# Patient Record
Sex: FEMALE | Race: WHITE | NOT HISPANIC OR LATINO | Employment: FULL TIME | URBAN - METROPOLITAN AREA
[De-identification: names, ages, dates, MRNs, and addresses within clinical notes are randomized per-mention and may not be internally consistent; named-entity substitution may affect disease eponyms.]

---

## 2019-03-12 ENCOUNTER — HOSPITAL ENCOUNTER (OUTPATIENT)
Dept: FAMILY MEDICINE CLINIC | Facility: CLINIC | Age: 44
Setting detail: SPECIMEN
Discharge: HOME | End: 2019-03-12
Attending: INTERNAL MEDICINE | Admitting: INTERNAL MEDICINE

## 2019-03-12 LAB
ALBUMIN SERPL-MCNC: 4 G/DL (ref 3.5–4.8)
ALBUMIN/GLOB SERPL: 1.5 {RATIO} (ref 1–1.7)
ALP SERPL-CCNC: 42 IU/L (ref 32–91)
ALT SERPL-CCNC: 13 IU/L (ref 14–54)
ANION GAP SERPL CALC-SCNC: 12.2 MMOL/L (ref 10–20)
AST SERPL-CCNC: 15 IU/L (ref 15–41)
BASOPHILS # BLD AUTO: 0.1 10*3/UL (ref 0–0.2)
BASOPHILS NFR BLD AUTO: 1 % (ref 0–2)
BILIRUB SERPL-MCNC: 0.8 MG/DL (ref 0.3–1.2)
BUN SERPL-MCNC: 11 MG/DL (ref 8–20)
BUN/CREAT SERPL: 13.8 (ref 5.4–26.2)
CALCIUM SERPL-MCNC: 8.8 MG/DL (ref 8.9–10.3)
CHLORIDE SERPL-SCNC: 105 MMOL/L (ref 101–111)
CHOLEST SERPL-MCNC: 193 MG/DL
CHOLEST/HDLC SERPL: 3.1 {RATIO}
CONV CO2: 25 MMOL/L (ref 22–32)
CONV LDL CHOLESTEROL DIRECT: 130 MG/DL (ref 0–100)
CONV TOTAL PROTEIN: 6.6 G/DL (ref 6.1–7.9)
CREAT UR-MCNC: 0.8 MG/DL (ref 0.4–1)
DIFFERENTIAL METHOD BLD: (no result)
EOSINOPHIL # BLD AUTO: 0.2 10*3/UL (ref 0–0.3)
EOSINOPHIL # BLD AUTO: 4 % (ref 0–3)
ERYTHROCYTE [DISTWIDTH] IN BLOOD BY AUTOMATED COUNT: 12.9 % (ref 11.5–14.5)
GLOBULIN UR ELPH-MCNC: 2.6 G/DL (ref 2.5–3.8)
GLUCOSE SERPL-MCNC: 92 MG/DL (ref 65–99)
HCT VFR BLD AUTO: 44.1 % (ref 35–49)
HDLC SERPL-MCNC: 63 MG/DL
HGB BLD-MCNC: 14.8 G/DL (ref 12–15)
LDLC/HDLC SERPL: 2.1 {RATIO}
LIPID INTERPRETATION: ABNORMAL
LYMPHOCYTES # BLD AUTO: 1.9 10*3/UL (ref 0.8–4.8)
LYMPHOCYTES NFR BLD AUTO: 31 % (ref 18–42)
MCH RBC QN AUTO: 31.3 PG (ref 26–32)
MCHC RBC AUTO-ENTMCNC: 33.4 G/DL (ref 32–36)
MCV RBC AUTO: 93.7 FL (ref 80–94)
MONOCYTES # BLD AUTO: 0.5 10*3/UL (ref 0.1–1.3)
MONOCYTES NFR BLD AUTO: 8 % (ref 2–11)
NEUTROPHILS # BLD AUTO: 3.5 10*3/UL (ref 2.3–8.6)
NEUTROPHILS NFR BLD AUTO: 56 % (ref 50–75)
NRBC BLD AUTO-RTO: 0 /100{WBCS}
NRBC/RBC NFR BLD MANUAL: 0 10*3/UL
PLATELET # BLD AUTO: 253 10*3/UL (ref 150–450)
PMV BLD AUTO: 8.3 FL (ref 7.4–10.4)
POTASSIUM SERPL-SCNC: 4.2 MMOL/L (ref 3.6–5.1)
RBC # BLD AUTO: 4.71 10*6/UL (ref 4–5.4)
SODIUM SERPL-SCNC: 138 MMOL/L (ref 136–144)
TRIGL SERPL-MCNC: 32 MG/DL
VLDLC SERPL CALC-MCNC: 0.3 MG/DL
WBC # BLD AUTO: 6.1 10*3/UL (ref 4.5–11.5)

## 2019-08-26 RX ORDER — CITALOPRAM 20 MG/1
TABLET ORAL
Qty: 90 TABLET | Refills: 2 | Status: SHIPPED | OUTPATIENT
Start: 2019-08-26 | End: 2020-06-03

## 2019-09-05 RX ORDER — ALPRAZOLAM 0.5 MG/1
TABLET ORAL
Qty: 25 TABLET | Refills: 0 | OUTPATIENT
Start: 2019-09-05

## 2019-09-25 ENCOUNTER — OFFICE VISIT (OUTPATIENT)
Dept: FAMILY MEDICINE CLINIC | Facility: CLINIC | Age: 44
End: 2019-09-25

## 2019-09-25 VITALS
RESPIRATION RATE: 8 BRPM | DIASTOLIC BLOOD PRESSURE: 80 MMHG | HEART RATE: 64 BPM | WEIGHT: 170 LBS | BODY MASS INDEX: 29.64 KG/M2 | TEMPERATURE: 98.5 F | SYSTOLIC BLOOD PRESSURE: 120 MMHG

## 2019-09-25 DIAGNOSIS — F41.9 ANXIETY: Primary | ICD-10-CM

## 2019-09-25 DIAGNOSIS — D05.12 DUCTAL CARCINOMA IN SITU (DCIS) OF LEFT BREAST: ICD-10-CM

## 2019-09-25 PROCEDURE — 99213 OFFICE O/P EST LOW 20 MIN: CPT | Performed by: INTERNAL MEDICINE

## 2019-09-25 RX ORDER — OMEPRAZOLE 20 MG/1
CAPSULE, DELAYED RELEASE ORAL
COMMUNITY
Start: 2015-06-18 | End: 2019-11-12

## 2019-09-25 RX ORDER — ALPRAZOLAM 0.5 MG/1
TABLET ORAL EVERY 24 HOURS
COMMUNITY
Start: 2019-03-07 | End: 2019-09-25 | Stop reason: SDUPTHER

## 2019-09-25 RX ORDER — ALPRAZOLAM 0.5 MG/1
0.5 TABLET ORAL NIGHTLY PRN
Qty: 30 TABLET | Refills: 1 | Status: SHIPPED | OUTPATIENT
Start: 2019-09-25 | End: 2020-12-01 | Stop reason: SDUPTHER

## 2019-09-25 RX ORDER — FLUTICASONE PROPIONATE 50 MCG
2 SPRAY, SUSPENSION (ML) NASAL DAILY
COMMUNITY
Start: 2013-04-02 | End: 2019-11-12

## 2019-09-25 RX ORDER — ELETRIPTAN HYDROBROMIDE 40 MG/1
TABLET, FILM COATED ORAL
COMMUNITY
Start: 2013-03-29 | End: 2019-11-12

## 2019-09-25 NOTE — PROGRESS NOTES
Rooming Tab(CC,VS,Pt Hx,Fall Screen)  Chief Complaint   Patient presents with   • Anxiety       Subjective   Pt here for anxiety-  Feels the celexa is working- but the fatigue is severe- occasionally still with panic attack and will  Take the xanax-    just found out that  Has DCIS- to get double mastectomy soon. Awaiting gene testing. A lot on plate  Poor sleep but dealing best can   I have reviewed and updated her medications, medical history and problem list during today's office visit.     Patient Care Team:  Mindy Hernandez MD as PCP - General (Internal Medicine)    Problem List Tab  Medications Tab  Synopsis Tab  Chart Review Tab  Care Everywhere Tab  Immunizations Tab  Patient History Tab    Social History     Tobacco Use   • Smoking status: Never Smoker   Substance Use Topics   • Alcohol use: Yes     Frequency: Monthly or less       Review of Systems   Constitutional: Negative for fatigue and fever.   HENT: Negative for congestion.    Respiratory: Negative for apnea, cough and wheezing.    Cardiovascular: Negative for chest pain.   Gastrointestinal: Negative for abdominal distention.   Neurological: Negative for syncope.   Psychiatric/Behavioral: Negative for behavioral problems.       Objective     Rooming Tab(CC,VS,Pt Hx,Fall Screen)  /80 (BP Location: Left arm, Patient Position: Sitting, Cuff Size: Adult)   Pulse 64   Temp 98.5 °F (36.9 °C)   Resp 8   Wt 77.1 kg (170 lb)   BMI 29.64 kg/m²     Body mass index is 29.64 kg/m².    Physical Exam   Constitutional: She is oriented to person, place, and time. She appears well-developed and well-nourished.   HENT:   Head: Normocephalic and atraumatic.   Right Ear: Tympanic membrane normal.   Left Ear: Tympanic membrane normal.   Eyes: Pupils are equal, round, and reactive to light.   Neck: Normal range of motion. Neck supple.   Cardiovascular: Normal rate and regular rhythm.   No murmur heard.  Pulmonary/Chest: Effort normal and breath sounds  normal.   Abdominal: Soft. Bowel sounds are normal. She exhibits no distension.   Neurological: She is oriented to person, place, and time.   Skin: Capillary refill takes less than 2 seconds.   Nursing note and vitals reviewed.       Statin Choice Calculator  Data Reviewed:                   Assessment/Plan   Order Review Tab  Health Maintenance Tab  Patient Plan/Order Tab  Diagnoses and all orders for this visit:    1. Anxiety (Primary)  Assessment & Plan:  Refill xanax      2. Ductal carcinoma in situ (DCIS) of left breast  Assessment & Plan:   To get gene testing back then decide  If lumpectomy or mastectomy and single or bilateral      Other orders  -     ALPRAZolam (XANAX) 0.5 MG tablet; Take 1 tablet by mouth At Night As Needed for Anxiety.  Dispense: 30 tablet; Refill: 1      Wrapup Tab  Return in about 6 months (around 3/25/2020).       They were informed of the diagnosis and treatment plan and directed to f/u for any further problems or concerns.

## 2019-10-31 ENCOUNTER — TRANSITIONAL CARE MANAGEMENT TELEPHONE ENCOUNTER (OUTPATIENT)
Dept: FAMILY MEDICINE CLINIC | Facility: CLINIC | Age: 44
End: 2019-10-31

## 2019-10-31 RX ORDER — HYDROCODONE BITARTRATE AND ACETAMINOPHEN 5; 325 MG/1; MG/1
1-2 TABLET ORAL
COMMUNITY
Start: 2019-10-29 | End: 2020-10-01

## 2019-10-31 RX ORDER — CEPHALEXIN 500 MG/1
500 CAPSULE ORAL 3 TIMES DAILY
COMMUNITY
Start: 2019-10-29 | End: 2019-11-08

## 2019-10-31 RX ORDER — PROMETHAZINE HYDROCHLORIDE 12.5 MG/1
12.5 TABLET ORAL
COMMUNITY
Start: 2019-10-29 | End: 2019-11-05

## 2019-11-12 ENCOUNTER — OFFICE VISIT (OUTPATIENT)
Dept: FAMILY MEDICINE CLINIC | Facility: CLINIC | Age: 44
End: 2019-11-12

## 2019-11-12 VITALS
RESPIRATION RATE: 16 BRPM | TEMPERATURE: 98.8 F | DIASTOLIC BLOOD PRESSURE: 86 MMHG | HEIGHT: 63 IN | HEART RATE: 64 BPM | SYSTOLIC BLOOD PRESSURE: 122 MMHG | OXYGEN SATURATION: 99 % | WEIGHT: 174.8 LBS | BODY MASS INDEX: 30.97 KG/M2

## 2019-11-12 DIAGNOSIS — D05.12 DUCTAL CARCINOMA IN SITU (DCIS) OF LEFT BREAST: Primary | ICD-10-CM

## 2019-11-12 DIAGNOSIS — IMO0001 TRANSITION OF CARE PERFORMED WITH SHARING OF CLINICAL SUMMARY: ICD-10-CM

## 2019-11-12 PROBLEM — Z78.9 TRANSITION OF CARE PERFORMED WITH SHARING OF CLINICAL SUMMARY: Status: ACTIVE | Noted: 2019-11-12

## 2019-11-12 PROCEDURE — 99495 TRANSJ CARE MGMT MOD F2F 14D: CPT | Performed by: INTERNAL MEDICINE

## 2019-11-12 NOTE — PROGRESS NOTES
"Rooming Tab(CC,VS,Pt Hx,Fall Screen)  Chief Complaint   Patient presents with   • Transitional Care Management       Subjective   Pt had left mastectomy last week with plastic surgery- Dr. Vicente-  Had drain removed yesterday-  Doing well down to 1 narcotic a day- mostly ibuprofen only. Some BM issues but getting better. To go back to work part time end of week   no lifting or climbing.   eating good    no depression  pathology good- lymph nodes negative.  Had to have mastectomy because of the size. Of the DCIS- to get implant on right side to match toe left now.   To see oncology next week to decide about if pills needed.   I have reviewed and updated her medications, medical history and problem list during today's office visit.     Patient Care Team:  Mindy Hernandez MD as PCP - General (Internal Medicine)    Problem List Tab  Medications Tab  Synopsis Tab  Chart Review Tab  Care Everywhere Tab  Immunizations Tab  Patient History Tab    Social History     Tobacco Use   • Smoking status: Never Smoker   • Smokeless tobacco: Never Used   Substance Use Topics   • Alcohol use: Yes     Frequency: Monthly or less       Review of Systems   Constitutional: Negative for fatigue and fever.   HENT: Negative for congestion.    Respiratory: Negative for apnea, cough and wheezing.    Cardiovascular: Negative for chest pain.   Gastrointestinal: Negative for abdominal distention.   Skin: Positive for bruise.   Neurological: Negative for syncope and weakness.   Psychiatric/Behavioral: Negative for behavioral problems.       Objective     Rooming Tab(CC,VS,Pt Hx,Fall Screen)  /86 (BP Location: Right arm, Patient Position: Sitting, Cuff Size: Adult)   Pulse 64   Temp 98.8 °F (37.1 °C) (Oral)   Resp 16   Ht 160 cm (63\")   Wt 79.3 kg (174 lb 12.8 oz)   SpO2 99%   BMI 30.96 kg/m²     Body mass index is 30.96 kg/m².    Physical Exam   Constitutional: She is oriented to person, place, and time. She appears " well-developed and well-nourished.   HENT:   Head: Normocephalic and atraumatic.   Eyes: EOM are normal. Pupils are equal, round, and reactive to light.   Cardiovascular: Normal rate and regular rhythm.   No murmur heard.  Pulmonary/Chest: Effort normal and breath sounds normal. No respiratory distress.   Abdominal: Soft. Bowel sounds are normal. She exhibits no distension.   Neurological: She is alert and oriented to person, place, and time.   Skin:   bruising and swelling in breast tissue- drain removed   Nursing note and vitals reviewed.       Statin Choice Calculator  Data Reviewed:               Lab Results   Component Value Date    WBC 4.75 10/29/2019    RBC 4.74 10/29/2019    HCT 44.9 10/29/2019    MCV 94.7 10/29/2019    MCH 31.4 10/29/2019      Assessment/Plan   Order Review Tab  Health Maintenance Tab  Patient Plan/Order Tab  Diagnoses and all orders for this visit:    1. Ductal carcinoma in situ (DCIS) of left breast (Primary)    2. Transition of care performed with sharing of clinical summary  Assessment & Plan:    To watch bowels.         Wrapup Tab  Return in about 4 weeks (around 12/10/2019).         Transitional Care Management Certification  I certify that the following are true:  1. Communication was made within 2 business days of discharge.  2. Complexity of Medical Decision Making is moderate.  3. Face to face visit occurred within 7 days.    *Note: 94305 is for high complexity patients with a face to face visit within 7 days of discharge.  09028 is for high complexity patients with a face to face on days 8-14 post discharge or medium complexity with face to face visit within 14 days post discharge.

## 2020-01-07 ENCOUNTER — TELEPHONE (OUTPATIENT)
Dept: FAMILY MEDICINE CLINIC | Facility: CLINIC | Age: 45
End: 2020-01-07

## 2020-01-07 NOTE — TELEPHONE ENCOUNTER
Two Saturdays ago patient was at [facility] and had double ear infection and sinus infection and was given Amoxil. She is better, but her left ear is throbbing/popping and uncomfortable. Asking what to do, or if need more med. Thank you.

## 2020-03-09 ENCOUNTER — TELEPHONE (OUTPATIENT)
Dept: FAMILY MEDICINE CLINIC | Facility: CLINIC | Age: 45
End: 2020-03-09

## 2020-03-09 RX ORDER — SUMATRIPTAN 100 MG/1
TABLET, FILM COATED ORAL
Qty: 9 TABLET | Refills: 0 | Status: SHIPPED | OUTPATIENT
Start: 2020-03-09 | End: 2020-10-01 | Stop reason: SDUPTHER

## 2020-03-09 RX ORDER — TAMOXIFEN CITRATE 20 MG/1
20 TABLET ORAL DAILY
COMMUNITY
Start: 2019-11-18 | End: 2020-10-01

## 2020-03-09 NOTE — TELEPHONE ENCOUNTER
Patient is calling to request something for migraine.  She states she has had it since Saturday.  There are no available appointments before 03/18, when she has a 6 month follow up scheduled with Dr. Hernandez.    [facility] [address], [place] confirmed    Patient call back [phone]

## 2020-06-03 RX ORDER — CITALOPRAM 20 MG/1
TABLET ORAL
Qty: 90 TABLET | Refills: 1 | Status: SHIPPED | OUTPATIENT
Start: 2020-06-03 | End: 2020-10-05

## 2020-10-01 ENCOUNTER — OFFICE VISIT (OUTPATIENT)
Dept: FAMILY MEDICINE CLINIC | Facility: CLINIC | Age: 45
End: 2020-10-01

## 2020-10-01 VITALS
OXYGEN SATURATION: 96 % | BODY MASS INDEX: 33.13 KG/M2 | WEIGHT: 187 LBS | TEMPERATURE: 97.3 F | DIASTOLIC BLOOD PRESSURE: 78 MMHG | RESPIRATION RATE: 18 BRPM | HEART RATE: 76 BPM | SYSTOLIC BLOOD PRESSURE: 126 MMHG

## 2020-10-01 DIAGNOSIS — G43.109 MIGRAINE WITH AURA AND WITHOUT STATUS MIGRAINOSUS, NOT INTRACTABLE: Primary | ICD-10-CM

## 2020-10-01 PROBLEM — Z00.00 PREVENTATIVE HEALTH CARE: Status: ACTIVE | Noted: 2019-03-12

## 2020-10-01 PROBLEM — F41.1 GENERALIZED ANXIETY DISORDER: Status: ACTIVE | Noted: 2019-03-07

## 2020-10-01 PROBLEM — C50.912 BREAST CANCER, LEFT (HCC): Status: ACTIVE | Noted: 2019-10-11

## 2020-10-01 PROCEDURE — 99213 OFFICE O/P EST LOW 20 MIN: CPT | Performed by: INTERNAL MEDICINE

## 2020-10-01 PROCEDURE — 90471 IMMUNIZATION ADMIN: CPT | Performed by: INTERNAL MEDICINE

## 2020-10-01 PROCEDURE — 90686 IIV4 VACC NO PRSV 0.5 ML IM: CPT | Performed by: INTERNAL MEDICINE

## 2020-10-01 RX ORDER — ERENUMAB-AOOE 70 MG/ML
70 INJECTION SUBCUTANEOUS ONCE
Qty: 1 ML | Refills: 5 | Status: SHIPPED | OUTPATIENT
Start: 2020-10-01 | End: 2020-10-01

## 2020-10-01 RX ORDER — SUMATRIPTAN 100 MG/1
TABLET, FILM COATED ORAL
Qty: 9 TABLET | Refills: 5 | Status: SHIPPED | OUTPATIENT
Start: 2020-10-01 | End: 2022-04-06 | Stop reason: SDUPTHER

## 2020-10-01 NOTE — PROGRESS NOTES
Rooming Tab(CC,VS,Pt Hx,Fall Screen)  Chief Complaint   Patient presents with   • Migraine       Subjective   Pt here with increased headaches-  Gets couple a week and debiliating imitrex works 50% of time only.     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 -   eats frequently- but not feeling that from depression      I have reviewed and updated her medications, medical history and problem list during today's office visit.     Patient Care Team:  Mindy Hernandez MD as PCP - General (Internal Medicine)    Problem List Tab  Medications Tab  Synopsis Tab  Chart Review Tab  Care Everywhere Tab  Immunizations Tab  Patient History Tab    Social History     Tobacco Use   • Smoking status: Never Smoker   • Smokeless tobacco: Never Used   Substance Use Topics   • Alcohol use: Yes     Frequency: Monthly or less       Review of Systems   Constitutional: Negative for fatigue.   Respiratory: Negative for chest tightness.    Cardiovascular: Negative for chest pain.   Neurological: Positive for headache.       Objective     Rooming Tab(CC,VS,Pt Hx,Fall Screen)  /78 (BP Location: Left arm, Patient Position: Sitting, Cuff Size: Adult)   Pulse 76   Temp 97.3 °F (36.3 °C) (Temporal)   Resp 18   Wt 84.8 kg (187 lb)   SpO2 96%   BMI 33.13 kg/m²     Body mass index is 33.13 kg/m².    Physical Exam  Vitals signs and nursing note reviewed.   Constitutional:       Appearance: Normal appearance.   HENT:      Left Ear: Tympanic membrane normal.   Cardiovascular:      Rate and Rhythm: Normal rate and regular rhythm.      Pulses: Normal pulses.      Heart sounds: Normal heart sounds.   Pulmonary:      Effort: Pulmonary effort is normal.      Breath sounds: Normal breath sounds.   Neurological:      Mental Status: She is alert.          Statin Choice Calculator  Data  Reviewed:               Lab Results   Component Value Date    TSH 1.500 08/25/2020    [id number] 42.9 08/25/2020      Assessment/Plan   Order Review Tab  Health Maintenance Tab  Patient Plan/Order Tab  Diagnoses and all orders for this visit:    1. Migraine with aura and without status migrainosus, not intractable (Primary)  Comments:  will start aimovg for preventative measures    Other orders  -     Erenumab-aooe (Aimovig) 70 MG/ML prefilled syringe; Inject 1 mL under the skin into the appropriate area as directed 1 (One) Time for 1 dose.  Dispense: 1 mL; Refill: 5  -     FluLaval Quad >6 Months (0774-2530)  -     SUMAtriptan (Imitrex) 100 MG tablet; Take one tablet at onset of headache. May repeat dose one time in 2 hours if headache not relieved.  Dispense: 9 tablet; Refill: 5        Wrapup Tab  Return if symptoms worsen or fail to improve.       They were informed of the diagnosis and treatment plan and directed to f/u for any further problems or concerns.

## 2020-10-05 RX ORDER — CITALOPRAM 20 MG/1
TABLET ORAL
Qty: 90 TABLET | Refills: 1 | Status: SHIPPED | OUTPATIENT
Start: 2020-10-05 | End: 2021-07-01

## 2020-10-22 ENCOUNTER — TELEPHONE (OUTPATIENT)
Dept: FAMILY MEDICINE CLINIC | Facility: CLINIC | Age: 45
End: 2020-10-22

## 2020-10-22 NOTE — TELEPHONE ENCOUNTER
Caller: KaelMikala    Relationship: Self    Best call back number: [phone]    What medication are you requesting: SOMETHING TO RELIEVE OF SYMPTOMS    What are your current symptoms: PAIN WHEN PRESSURE ON VAGINA, SPECIFICALLY NOT THE LABIA BUT INSIDE OF VAGINA, NOT ITCHING OR BURNING    How long have you been experiencing symptoms: 2 DAYS    Have you had these symptoms before:    [x] Yes  [] No    Have you been treated for these symptoms before:   [x] Yes  [] No    If a prescription is needed, what is your preferred pharmacy and phone number: [id number] [place] [facility] [address] [facility] [phone] [facility] [phone]      Additional notes: PATIENT STATES SHE TOOK AN AZO AND IT SEEMED TO HELP. PATIENT STATES SHE HAS HAD A YEAST INFECTION BEFORE BUT DOESN'T FEEL LIKE THIS IS A YEAST INFECTION DUE TO NO ITCHING NOR BURNING. PATIENT STATES NO PAIN WHILE URINATING, JUST GENERAL DISCOMFORT

## 2020-10-22 NOTE — TELEPHONE ENCOUNTER
Pt would need to be examined to see if vagina bacterial infection-  Can try OTC yeast infection cream, and if no better would be seen

## 2020-12-01 DIAGNOSIS — F41.9 ANXIETY: Primary | ICD-10-CM

## 2020-12-02 RX ORDER — ALPRAZOLAM 0.5 MG/1
0.5 TABLET ORAL NIGHTLY PRN
Qty: 30 TABLET | Refills: 1 | Status: SHIPPED | OUTPATIENT
Start: 2020-12-02 | End: 2021-05-11

## 2020-12-17 ENCOUNTER — TELEPHONE (OUTPATIENT)
Dept: FAMILY MEDICINE CLINIC | Facility: CLINIC | Age: 45
End: 2020-12-17

## 2020-12-17 NOTE — TELEPHONE ENCOUNTER
PATIENT REPORTS THE FOLLOWING SYMPTOMS:    * STOPPED UP NOSE: CONGESTIONS  * CHILLS  * BODY ACHES  * COUGHING  * FATIGUE    PATIENT STATED SHE COULD NOT GET A COVID TEST UNTIL Saturday. I TOLD HER ABOUT URGENT CARE.    CALL BACK #; [phone]    PHARMACY: [id number] [address] [phone] [facility] [phone]

## 2020-12-17 NOTE — TELEPHONE ENCOUNTER
Sounds like pt has COVID- would stay quarantined in home 10 days from when symptoms started. Check o2 sats if develops short or air.  I can schedule a COVID test at outpt lab on Friday if she is wanting one.  Take 2000 units of vD3, tylenol cold and flu and lots of fluids

## 2020-12-18 PROCEDURE — U0003 INFECTIOUS AGENT DETECTION BY NUCLEIC ACID (DNA OR RNA); SEVERE ACUTE RESPIRATORY SYNDROME CORONAVIRUS 2 (SARS-COV-2) (CORONAVIRUS DISEASE [COVID-19]), AMPLIFIED PROBE TECHNIQUE, MAKING USE OF HIGH THROUGHPUT TECHNOLOGIES AS DESCRIBED BY CMS-2020-01-R: HCPCS | Performed by: NURSE PRACTITIONER

## 2020-12-18 NOTE — TELEPHONE ENCOUNTER
Attempted to call patient this morning, her mailbox is full. It [place] [facility] [place]

## 2020-12-18 NOTE — TELEPHONE ENCOUNTER
"ATTEMPTED TO CALL PATIENT 2ND TIME, STILL NO ANSWER.    HUB IS PATIENT CALL BACK PLEASE TELL HER TE FOLLOWING:    \"Sounds like pt has COVID- would stay quarantined in home 10 days from when symptoms started. Check o2 sats if develops short or air.  I can schedule a COVID test at outpt lab on Friday if she is wanting one.  Take 2000 units of vD3, tylenol cold and flu and lots of fluids\"  "

## 2020-12-21 ENCOUNTER — TELEPHONE (OUTPATIENT)
Dept: URGENT CARE | Facility: CLINIC | Age: 45
End: 2020-12-21

## 2020-12-21 NOTE — TELEPHONE ENCOUNTER
Made aware of positive COVID-19 test results.  She will continue to quarantine for the full 10 days.  She has been made aware that someone from the health department will be notifying her that she needs to contact anyone that she was in close proximity with 48 hours prior to her symptoms.  Her family is on quarantine as well.  They are quarantine will start 14 days once her 10 days is up.  Patient verbalized understanding she states she has no new or worsening symptoms at this time.  I did request that she had a follow-up visit with her PCP approximately 1 week after her quarantine has .  Patient verbalized understanding

## 2021-05-11 DIAGNOSIS — F41.9 ANXIETY: ICD-10-CM

## 2021-05-11 RX ORDER — ALPRAZOLAM 0.5 MG/1
0.5 TABLET ORAL NIGHTLY PRN
Qty: 30 TABLET | Refills: 2 | Status: SHIPPED | OUTPATIENT
Start: 2021-05-11 | End: 2021-12-14

## 2021-07-01 RX ORDER — CITALOPRAM 20 MG/1
TABLET ORAL
Qty: 90 TABLET | Refills: 1 | Status: SHIPPED | OUTPATIENT
Start: 2021-07-01 | End: 2021-12-10

## 2021-12-10 ENCOUNTER — OFFICE VISIT (OUTPATIENT)
Dept: FAMILY MEDICINE CLINIC | Facility: CLINIC | Age: 46
End: 2021-12-10

## 2021-12-10 ENCOUNTER — LAB (OUTPATIENT)
Dept: FAMILY MEDICINE CLINIC | Facility: CLINIC | Age: 46
End: 2021-12-10

## 2021-12-10 VITALS
WEIGHT: 202 LBS | SYSTOLIC BLOOD PRESSURE: 132 MMHG | HEIGHT: 63 IN | OXYGEN SATURATION: 98 % | TEMPERATURE: 98.2 F | BODY MASS INDEX: 35.79 KG/M2 | HEART RATE: 77 BPM | DIASTOLIC BLOOD PRESSURE: 92 MMHG

## 2021-12-10 DIAGNOSIS — I10 PRIMARY HYPERTENSION: ICD-10-CM

## 2021-12-10 DIAGNOSIS — I10 PRIMARY HYPERTENSION: Primary | ICD-10-CM

## 2021-12-10 LAB
ANION GAP SERPL CALCULATED.3IONS-SCNC: 8 MMOL/L (ref 5–15)
BASOPHILS # BLD AUTO: 0.06 10*3/MM3 (ref 0–0.2)
BASOPHILS NFR BLD AUTO: 0.9 % (ref 0–1.5)
BUN SERPL-MCNC: 13 MG/DL (ref 6–20)
BUN/CREAT SERPL: 15.5 (ref 7–25)
CALCIUM SPEC-SCNC: 9.7 MG/DL (ref 8.6–10.5)
CHLORIDE SERPL-SCNC: 101 MMOL/L (ref 98–107)
CO2 SERPL-SCNC: 27 MMOL/L (ref 22–29)
CREAT SERPL-MCNC: 0.84 MG/DL (ref 0.57–1)
DEPRECATED RDW RBC AUTO: 43.1 FL (ref 37–54)
EOSINOPHIL # BLD AUTO: 0.25 10*3/MM3 (ref 0–0.4)
EOSINOPHIL NFR BLD AUTO: 3.7 % (ref 0.3–6.2)
ERYTHROCYTE [DISTWIDTH] IN BLOOD BY AUTOMATED COUNT: 12.3 % (ref 12.3–15.4)
GFR SERPL CREATININE-BSD FRML MDRD: 72 ML/MIN/1.73
GLUCOSE SERPL-MCNC: 79 MG/DL (ref 65–99)
HCT VFR BLD AUTO: 48 % (ref 34–46.6)
HGB BLD-MCNC: 15.5 G/DL (ref 12–15.9)
IMM GRANULOCYTES # BLD AUTO: 0.03 10*3/MM3 (ref 0–0.05)
IMM GRANULOCYTES NFR BLD AUTO: 0.4 % (ref 0–0.5)
LYMPHOCYTES # BLD AUTO: 2.33 10*3/MM3 (ref 0.7–3.1)
LYMPHOCYTES NFR BLD AUTO: 34.2 % (ref 19.6–45.3)
MCH RBC QN AUTO: 30.5 PG (ref 26.6–33)
MCHC RBC AUTO-ENTMCNC: 32.3 G/DL (ref 31.5–35.7)
MCV RBC AUTO: 94.3 FL (ref 79–97)
MONOCYTES # BLD AUTO: 0.58 10*3/MM3 (ref 0.1–0.9)
MONOCYTES NFR BLD AUTO: 8.5 % (ref 5–12)
NEUTROPHILS NFR BLD AUTO: 3.57 10*3/MM3 (ref 1.7–7)
NEUTROPHILS NFR BLD AUTO: 52.3 % (ref 42.7–76)
NRBC BLD AUTO-RTO: 0 /100 WBC (ref 0–0.2)
PLATELET # BLD AUTO: 223 10*3/MM3 (ref 140–450)
PMV BLD AUTO: 11.5 FL (ref 6–12)
POTASSIUM SERPL-SCNC: 4.7 MMOL/L (ref 3.5–5.2)
RBC # BLD AUTO: 5.09 10*6/MM3 (ref 3.77–5.28)
SODIUM SERPL-SCNC: 136 MMOL/L (ref 136–145)
TSH SERPL DL<=0.05 MIU/L-ACNC: 2.02 UIU/ML (ref 0.27–4.2)
WBC NRBC COR # BLD: 6.82 10*3/MM3 (ref 3.4–10.8)

## 2021-12-10 PROCEDURE — 99213 OFFICE O/P EST LOW 20 MIN: CPT | Performed by: HOSPITALIST

## 2021-12-10 PROCEDURE — 85025 COMPLETE CBC W/AUTO DIFF WBC: CPT | Performed by: HOSPITALIST

## 2021-12-10 PROCEDURE — 36415 COLL VENOUS BLD VENIPUNCTURE: CPT

## 2021-12-10 PROCEDURE — 84443 ASSAY THYROID STIM HORMONE: CPT | Performed by: HOSPITALIST

## 2021-12-10 PROCEDURE — 80048 BASIC METABOLIC PNL TOTAL CA: CPT | Performed by: HOSPITALIST

## 2021-12-10 RX ORDER — VENLAFAXINE HYDROCHLORIDE 37.5 MG/1
1 CAPSULE, EXTENDED RELEASE ORAL DAILY
COMMUNITY
Start: 2021-09-23

## 2021-12-10 RX ORDER — LISINOPRIL 2.5 MG/1
5 TABLET ORAL DAILY
Qty: 30 TABLET | Refills: 3 | Status: SHIPPED | OUTPATIENT
Start: 2021-12-10 | End: 2021-12-13

## 2021-12-10 NOTE — PROGRESS NOTES
"Subjective   Mikala Scruggs is a 46 y.o. female.     Subjective / HPI  Patient says she has been running BP on higher side at home, she has been checking it on regular basis for last five days. It has been in 150 and 140s, at times she has noted some headaches with it.     Review of Systems    Objective     /92 (BP Location: Left arm, Patient Position: Sitting, Cuff Size: Adult)   Pulse 77   Temp 98.2 °F (36.8 °C) (Skin)   Ht 160 cm (63\")   Wt 91.6 kg (202 lb)   SpO2 98%   BMI 35.78 kg/m²      Physical Exam  Vitals and nursing note reviewed.   Constitutional:       General: She is not in acute distress.     Appearance: Normal appearance. She is well-developed. She is not ill-appearing, toxic-appearing or diaphoretic.   HENT:      Head: Normocephalic and atraumatic.      Right Ear: Ear canal and external ear normal.      Left Ear: Ear canal and external ear normal.      Nose: Nose normal. No congestion or rhinorrhea.      Mouth/Throat:      Mouth: Mucous membranes are moist.      Pharynx: No oropharyngeal exudate.   Eyes:      General: No scleral icterus.        Right eye: No discharge.         Left eye: No discharge.      Extraocular Movements: Extraocular movements intact.      Conjunctiva/sclera: Conjunctivae normal.      Pupils: Pupils are equal, round, and reactive to light.   Neck:      Thyroid: No thyromegaly.      Vascular: No carotid bruit or JVD.      Trachea: No tracheal deviation.   Cardiovascular:      Rate and Rhythm: Normal rate and regular rhythm.      Pulses: Normal pulses.      Heart sounds: Normal heart sounds. No murmur heard.  No friction rub. No gallop.    Pulmonary:      Effort: Pulmonary effort is normal. No respiratory distress.      Breath sounds: Normal breath sounds. No stridor. No wheezing, rhonchi or rales.   Chest:      Chest wall: No tenderness.   Abdominal:      General: Bowel sounds are normal. There is no distension.      Palpations: Abdomen is soft. There is no mass.     "  Tenderness: There is no abdominal tenderness. There is no guarding or rebound.      Hernia: No hernia is present.   Musculoskeletal:         General: No swelling, tenderness, deformity or signs of injury. Normal range of motion.      Cervical back: Normal range of motion and neck supple. No rigidity. No muscular tenderness.      Right lower leg: No edema.      Left lower leg: No edema.   Lymphadenopathy:      Cervical: No cervical adenopathy.   Skin:     General: Skin is warm and dry.      Coloration: Skin is not jaundiced or pale.      Findings: No bruising, erythema or rash.   Neurological:      General: No focal deficit present.      Mental Status: She is alert and oriented to person, place, and time. Mental status is at baseline.      Cranial Nerves: No cranial nerve deficit.      Sensory: No sensory deficit.      Motor: No weakness or abnormal muscle tone.      Coordination: Coordination normal.   Psychiatric:         Mood and Affect: Mood normal.         Behavior: Behavior normal.         Thought Content: Thought content normal.         Judgment: Judgment normal.         Procedures       Assessment/Plan   Diagnoses and all orders for this visit:    1. Primary hypertension (Primary)  -     TSH; Future  -     CBC w AUTO Differential; Future  -     Basic metabolic panel; Future    Other orders  -     lisinopril (Zestril) 2.5 MG tablet; Take 2 tablets by mouth Daily.  Dispense: 30 tablet; Refill: 3

## 2021-12-13 DIAGNOSIS — F41.9 ANXIETY: ICD-10-CM

## 2021-12-13 RX ORDER — LISINOPRIL 2.5 MG/1
5 TABLET ORAL DAILY
Qty: 60 TABLET | Refills: 0 | Status: SHIPPED | OUTPATIENT
Start: 2021-12-13 | End: 2021-12-14 | Stop reason: SDUPTHER

## 2021-12-14 RX ORDER — ALPRAZOLAM 0.5 MG/1
0.5 TABLET ORAL NIGHTLY PRN
Qty: 30 TABLET | Refills: 1 | Status: SHIPPED | OUTPATIENT
Start: 2021-12-14 | End: 2022-03-31

## 2021-12-14 RX ORDER — LISINOPRIL 5 MG/1
5 TABLET ORAL DAILY
Qty: 90 TABLET | Refills: 1 | Status: SHIPPED | OUTPATIENT
Start: 2021-12-14 | End: 2022-06-09

## 2021-12-30 ENCOUNTER — OFFICE VISIT (OUTPATIENT)
Dept: FAMILY MEDICINE CLINIC | Facility: CLINIC | Age: 46
End: 2021-12-30

## 2021-12-30 VITALS
WEIGHT: 200 LBS | HEIGHT: 63 IN | TEMPERATURE: 96.9 F | OXYGEN SATURATION: 99 % | RESPIRATION RATE: 16 BRPM | HEART RATE: 77 BPM | SYSTOLIC BLOOD PRESSURE: 126 MMHG | DIASTOLIC BLOOD PRESSURE: 86 MMHG | BODY MASS INDEX: 35.44 KG/M2

## 2021-12-30 DIAGNOSIS — L70.0 ACNE VULGARIS: ICD-10-CM

## 2021-12-30 DIAGNOSIS — I10 PRIMARY HYPERTENSION: Primary | ICD-10-CM

## 2021-12-30 PROCEDURE — 99213 OFFICE O/P EST LOW 20 MIN: CPT | Performed by: INTERNAL MEDICINE

## 2021-12-30 RX ORDER — CLINDAMYCIN AND BENZOYL PEROXIDE 10; 50 MG/G; MG/G
1 GEL TOPICAL 2 TIMES DAILY
Qty: 50 G | Refills: 2 | Status: SHIPPED | OUTPATIENT
Start: 2021-12-30 | End: 2022-09-12

## 2022-01-04 ENCOUNTER — TELEPHONE (OUTPATIENT)
Dept: FAMILY MEDICINE CLINIC | Facility: CLINIC | Age: 47
End: 2022-01-04

## 2022-01-14 RX ORDER — CITALOPRAM 20 MG/1
TABLET ORAL
Qty: 90 TABLET | Refills: 1 | Status: SHIPPED | OUTPATIENT
Start: 2022-01-14 | End: 2022-04-06

## 2022-02-10 ENCOUNTER — TELEPHONE (OUTPATIENT)
Dept: FAMILY MEDICINE CLINIC | Facility: CLINIC | Age: 47
End: 2022-02-10

## 2022-02-10 DIAGNOSIS — J39.2 THROAT IRRITATION: Primary | ICD-10-CM

## 2022-02-10 DIAGNOSIS — Z12.11 SCREENING FOR MALIGNANT NEOPLASM OF COLON: ICD-10-CM

## 2022-02-10 NOTE — TELEPHONE ENCOUNTER
I received a note from [facility] asking for a EGD and colonoscopy referral for patient, I tried reaching out to patient but was unable to reach her. I don't see any mention in her recent visits for an EGD, okay for order? I did also try to call Amy at [facility] and had to leave her a voicemail as well.

## 2022-03-21 ENCOUNTER — ON CAMPUS - OUTPATIENT (AMBULATORY)
Dept: URBAN - METROPOLITAN AREA HOSPITAL 2 | Facility: HOSPITAL | Age: 47
End: 2022-03-21
Payer: COMMERCIAL

## 2022-03-21 ENCOUNTER — OFFICE (AMBULATORY)
Dept: URBAN - METROPOLITAN AREA PATHOLOGY 4 | Facility: PATHOLOGY | Age: 47
End: 2022-03-21

## 2022-03-21 VITALS
OXYGEN SATURATION: 98 % | DIASTOLIC BLOOD PRESSURE: 74 MMHG | HEART RATE: 77 BPM | DIASTOLIC BLOOD PRESSURE: 65 MMHG | SYSTOLIC BLOOD PRESSURE: 127 MMHG | OXYGEN SATURATION: 97 % | HEART RATE: 81 BPM | HEART RATE: 66 BPM | RESPIRATION RATE: 17 BRPM | DIASTOLIC BLOOD PRESSURE: 98 MMHG | HEART RATE: 103 BPM | HEART RATE: 91 BPM | HEART RATE: 97 BPM | RESPIRATION RATE: 18 BRPM | SYSTOLIC BLOOD PRESSURE: 167 MMHG | WEIGHT: 182 LBS | HEART RATE: 90 BPM | DIASTOLIC BLOOD PRESSURE: 107 MMHG | OXYGEN SATURATION: 99 % | OXYGEN SATURATION: 100 % | SYSTOLIC BLOOD PRESSURE: 117 MMHG | SYSTOLIC BLOOD PRESSURE: 154 MMHG | TEMPERATURE: 98.2 F | SYSTOLIC BLOOD PRESSURE: 109 MMHG | DIASTOLIC BLOOD PRESSURE: 103 MMHG | HEIGHT: 63 IN | SYSTOLIC BLOOD PRESSURE: 134 MMHG | RESPIRATION RATE: 16 BRPM | HEART RATE: 92 BPM | DIASTOLIC BLOOD PRESSURE: 85 MMHG | DIASTOLIC BLOOD PRESSURE: 78 MMHG | SYSTOLIC BLOOD PRESSURE: 125 MMHG | SYSTOLIC BLOOD PRESSURE: 131 MMHG | DIASTOLIC BLOOD PRESSURE: 76 MMHG | SYSTOLIC BLOOD PRESSURE: 140 MMHG | HEART RATE: 84 BPM | DIASTOLIC BLOOD PRESSURE: 80 MMHG | DIASTOLIC BLOOD PRESSURE: 100 MMHG | HEART RATE: 93 BPM | DIASTOLIC BLOOD PRESSURE: 102 MMHG | SYSTOLIC BLOOD PRESSURE: 144 MMHG | HEART RATE: 85 BPM

## 2022-03-21 DIAGNOSIS — K29.50 UNSPECIFIED CHRONIC GASTRITIS WITHOUT BLEEDING: ICD-10-CM

## 2022-03-21 DIAGNOSIS — K31.7 POLYP OF STOMACH AND DUODENUM: ICD-10-CM

## 2022-03-21 DIAGNOSIS — R11.0 NAUSEA: ICD-10-CM

## 2022-03-21 DIAGNOSIS — D12.2 BENIGN NEOPLASM OF ASCENDING COLON: ICD-10-CM

## 2022-03-21 DIAGNOSIS — Z12.11 ENCOUNTER FOR SCREENING FOR MALIGNANT NEOPLASM OF COLON: ICD-10-CM

## 2022-03-21 DIAGNOSIS — R10.11 RIGHT UPPER QUADRANT PAIN: ICD-10-CM

## 2022-03-21 PROBLEM — K63.5 POLYP OF COLON: Status: ACTIVE | Noted: 2022-03-21

## 2022-03-21 LAB
GI HISTOLOGY: A. SELECT: (no result)
GI HISTOLOGY: B. UNSPECIFIED: (no result)
GI HISTOLOGY: C. UNSPECIFIED: (no result)
GI HISTOLOGY: PDF REPORT: (no result)

## 2022-03-21 PROCEDURE — 45385 COLONOSCOPY W/LESION REMOVAL: CPT | Mod: 33 | Performed by: INTERNAL MEDICINE

## 2022-03-21 PROCEDURE — 43239 EGD BIOPSY SINGLE/MULTIPLE: CPT | Performed by: INTERNAL MEDICINE

## 2022-03-21 PROCEDURE — 88305 TISSUE EXAM BY PATHOLOGIST: CPT | Performed by: INTERNAL MEDICINE

## 2022-03-31 DIAGNOSIS — F41.9 ANXIETY: ICD-10-CM

## 2022-03-31 RX ORDER — ALPRAZOLAM 0.5 MG/1
0.5 TABLET ORAL NIGHTLY PRN
Qty: 30 TABLET | Refills: 1 | Status: SHIPPED | OUTPATIENT
Start: 2022-03-31 | End: 2022-11-14

## 2022-04-06 ENCOUNTER — OFFICE VISIT (OUTPATIENT)
Dept: FAMILY MEDICINE CLINIC | Facility: CLINIC | Age: 47
End: 2022-04-06

## 2022-04-06 ENCOUNTER — LAB (OUTPATIENT)
Dept: FAMILY MEDICINE CLINIC | Facility: CLINIC | Age: 47
End: 2022-04-06

## 2022-04-06 VITALS
BODY MASS INDEX: 35.26 KG/M2 | SYSTOLIC BLOOD PRESSURE: 110 MMHG | WEIGHT: 199 LBS | DIASTOLIC BLOOD PRESSURE: 82 MMHG | HEIGHT: 63 IN | RESPIRATION RATE: 16 BRPM | HEART RATE: 73 BPM | OXYGEN SATURATION: 97 % | TEMPERATURE: 97.5 F

## 2022-04-06 DIAGNOSIS — E55.9 VITAMIN D DEFICIENCY: ICD-10-CM

## 2022-04-06 DIAGNOSIS — I10 PRIMARY HYPERTENSION: ICD-10-CM

## 2022-04-06 DIAGNOSIS — R73.09 ELEVATED GLUCOSE: Primary | ICD-10-CM

## 2022-04-06 LAB
25(OH)D3 SERPL-MCNC: 22 NG/ML (ref 30–100)
CHOLEST SERPL-MCNC: 227 MG/DL (ref 0–200)
HBA1C MFR BLD: 5.4 % (ref 3.5–5.6)
HDLC SERPL-MCNC: 55 MG/DL (ref 40–60)
LDLC SERPL CALC-MCNC: 157 MG/DL (ref 0–100)
LDLC/HDLC SERPL: 2.83 {RATIO}
TRIGL SERPL-MCNC: 83 MG/DL (ref 0–150)
VLDLC SERPL-MCNC: 15 MG/DL (ref 5–40)

## 2022-04-06 PROCEDURE — 82306 VITAMIN D 25 HYDROXY: CPT | Performed by: INTERNAL MEDICINE

## 2022-04-06 PROCEDURE — 99214 OFFICE O/P EST MOD 30 MIN: CPT | Performed by: INTERNAL MEDICINE

## 2022-04-06 PROCEDURE — 80061 LIPID PANEL: CPT | Performed by: INTERNAL MEDICINE

## 2022-04-06 PROCEDURE — 83036 HEMOGLOBIN GLYCOSYLATED A1C: CPT | Performed by: INTERNAL MEDICINE

## 2022-04-06 PROCEDURE — 36415 COLL VENOUS BLD VENIPUNCTURE: CPT | Performed by: INTERNAL MEDICINE

## 2022-04-06 RX ORDER — SUMATRIPTAN 100 MG/1
TABLET, FILM COATED ORAL
Qty: 9 TABLET | Refills: 5 | Status: SHIPPED | OUTPATIENT
Start: 2022-04-06 | End: 2022-04-06

## 2022-04-06 RX ORDER — SUMATRIPTAN 100 MG/1
TABLET, FILM COATED ORAL
Qty: 24 TABLET | Refills: 5 | Status: SHIPPED | OUTPATIENT
Start: 2022-04-06

## 2022-04-06 NOTE — PROGRESS NOTES
"Rooming Tab(CC,VS,Pt Hx,Fall Screen)  Chief Complaint   Patient presents with   • Hypertension   • Migraine       Subjective     Mikala Scruggs is a 46 y.o. female who presents for follow-up of hypertension and migraines.    Migraines  The patient reports that her headaches are doing well. She states that she had one headache this month. She reports that she started feeling like she was going to get a headache yesterday, but it never did.    Insomnia  The patient reports that she is sleeping well. She states that sometimes she has to take the Xanax approximately once per week because her mind goes \"100 miles an hour\".  She reports that she is on Effexor 37.5 mg which helps.    Hypertension  She is compliant with lisinopril. The patient reports that she bought a [employer] and she has been riding every day since 02/14/2022. She has lost 3 pounds since her last visit. She reports that she drinks maybe 2 glasses of water a day.    Health maintenance  The patient reports that she has had her first 2 COVID-19 vaccines. She stats she has had COVID-19 twice. She reports that she is tired all the time.      I have reviewed and updated her medications, medical history and problem list during today's office visit.     Patient Care Team:  Mindy Hernandez MD as PCP - General (Internal Medicine)    Problem List Tab  Medications Tab  Synopsis Tab  Chart Review Tab  Care Everywhere Tab  Immunizations Tab  Patient History Tab    Social History     Tobacco Use   • Smoking status: Never Smoker   • Smokeless tobacco: Never Used   Substance Use Topics   • Alcohol use: Yes     Alcohol/week: 1.0 standard drink     Types: 1 Glasses of wine per week       Review of Systems  Answers for HPI/ROS submitted by the patient on 4/4/2022  What is the primary reason for your visit?: High Blood Pressure      Objective     Rooming Tab(CC,VS,Pt Hx,Fall Screen)  /82 (BP Location: Left arm, Patient Position: Sitting, Cuff Size: Adult)   Pulse " "73   Temp 97.5 °F (36.4 °C) (Temporal)   Resp 16   Ht 160 cm (62.99\")   Wt 90.3 kg (199 lb)   SpO2 97%   BMI 35.26 kg/m²     Body mass index is 35.26 kg/m².    Physical Exam  Vitals and nursing note reviewed.   Constitutional:       Appearance: Normal appearance. She is well-developed.   HENT:      Head: Normocephalic and atraumatic.      Right Ear: Tympanic membrane normal.      Left Ear: Tympanic membrane normal.      Nose: No rhinorrhea.      Mouth/Throat:      Pharynx: No posterior oropharyngeal erythema.   Eyes:      Pupils: Pupils are equal, round, and reactive to light.   Cardiovascular:      Rate and Rhythm: Normal rate and regular rhythm.      Pulses: Normal pulses.      Heart sounds: Normal heart sounds. No murmur heard.  Pulmonary:      Effort: Pulmonary effort is normal.      Breath sounds: Normal breath sounds.   Abdominal:      General: Bowel sounds are normal. There is no distension.      Palpations: Abdomen is soft.   Musculoskeletal:         General: No tenderness.      Cervical back: Normal range of motion and neck supple.   Skin:     Capillary Refill: Capillary refill takes less than 2 seconds.   Neurological:      Mental Status: She is alert and oriented to person, place, and time.   Psychiatric:         Mood and Affect: Mood normal.         Behavior: Behavior normal.          Statin Choice Calculator  Data Reviewed:         The data below has been reviewed by Mindy Hernandez MD on 04/06/2022.      Lab Results   Component Value Date    TRIG 83 04/06/2022    HDL 55 04/06/2022    VLDL 15 04/06/2022     (H) 04/06/2022    LDLHDL 2.83 04/06/2022    [id number] 22.0 (L) 04/06/2022      Assessment/Plan   Order Review Tab  Health Maintenance Tab  Patient Plan/Order Tab  Diagnoses and all orders for this visit:    1. Elevated glucose (Primary)  -     Hemoglobin A1c    2. Primary hypertension  -     Lipid Panel    3. Vitamin D deficiency  -     Vitamin D 25 Hydroxy    Other orders  -     " Discontinue: SUMAtriptan (Imitrex) 100 MG tablet; Take one tablet at onset of headache. May repeat dose one time in 2 hours if headache not relieved.  Dispense: 9 tablet; Refill: 5  -     SUMAtriptan (Imitrex) 100 MG tablet; Take one tablet at onset of headache. May repeat dose one time in 2 hours if headache not relieved.  Dispense: 24 tablet; Refill: 5        Wrapup Tab  Return if symptoms worsen or fail to improve.       They were informed of the diagnosis and treatment plan and directed to f/u for any further problems or concerns.    Transcribed from ambient dictation for Mindy Hernandez MD by Chloe Danielle.  04/06/22   11:58 EDT    Patient verbalized consent to the visit recording.  I have personally performed the services described in this document as transcribed by the above individual, and it is both accurate and complete.  Chloe Danielle  4/9/2022  11:58 EDT

## 2022-04-08 NOTE — PROGRESS NOTES
Cholesterol has increased 30 points- work on diet and exercise and recheck in -4 months fasting. Also vD is low- take 5000 units of vd3 daily

## 2022-06-09 RX ORDER — LISINOPRIL 5 MG/1
TABLET ORAL
Qty: 90 TABLET | Refills: 1 | Status: SHIPPED | OUTPATIENT
Start: 2022-06-09 | End: 2022-12-16

## 2022-09-12 PROCEDURE — 87086 URINE CULTURE/COLONY COUNT: CPT | Performed by: FAMILY MEDICINE

## 2022-11-14 DIAGNOSIS — F41.9 ANXIETY: ICD-10-CM

## 2022-11-14 RX ORDER — ALPRAZOLAM 0.5 MG/1
0.5 TABLET ORAL NIGHTLY PRN
Qty: 30 TABLET | Refills: 0 | Status: SHIPPED | OUTPATIENT
Start: 2022-11-14 | End: 2023-02-13

## 2022-12-16 RX ORDER — LISINOPRIL 5 MG/1
TABLET ORAL
Qty: 90 TABLET | Refills: 1 | Status: SHIPPED | OUTPATIENT
Start: 2022-12-16

## 2023-02-12 DIAGNOSIS — F41.9 ANXIETY: ICD-10-CM

## 2023-02-13 RX ORDER — ALPRAZOLAM 0.5 MG/1
0.5 TABLET ORAL NIGHTLY PRN
Qty: 30 TABLET | Refills: 0 | Status: SHIPPED | OUTPATIENT
Start: 2023-02-13

## 2023-05-16 DIAGNOSIS — F41.9 ANXIETY: ICD-10-CM

## 2023-05-16 RX ORDER — ALPRAZOLAM 0.5 MG/1
0.5 TABLET ORAL NIGHTLY PRN
Qty: 30 TABLET | Refills: 1 | Status: SHIPPED | OUTPATIENT
Start: 2023-05-16

## 2023-06-13 RX ORDER — LISINOPRIL 5 MG/1
TABLET ORAL
Qty: 90 TABLET | Refills: 1 | Status: SHIPPED | OUTPATIENT
Start: 2023-06-13

## 2023-10-16 ENCOUNTER — OFFICE VISIT (OUTPATIENT)
Dept: FAMILY MEDICINE CLINIC | Facility: CLINIC | Age: 48
End: 2023-10-16
Payer: COMMERCIAL

## 2023-10-16 ENCOUNTER — LAB (OUTPATIENT)
Dept: FAMILY MEDICINE CLINIC | Facility: CLINIC | Age: 48
End: 2023-10-16
Payer: COMMERCIAL

## 2023-10-16 VITALS
HEART RATE: 75 BPM | BODY MASS INDEX: 31.82 KG/M2 | DIASTOLIC BLOOD PRESSURE: 79 MMHG | RESPIRATION RATE: 18 BRPM | WEIGHT: 179.6 LBS | HEIGHT: 63 IN | OXYGEN SATURATION: 96 % | SYSTOLIC BLOOD PRESSURE: 123 MMHG

## 2023-10-16 DIAGNOSIS — I10 PRIMARY HYPERTENSION: ICD-10-CM

## 2023-10-16 DIAGNOSIS — G43.109 MIGRAINE WITH AURA AND WITHOUT STATUS MIGRAINOSUS, NOT INTRACTABLE: ICD-10-CM

## 2023-10-16 DIAGNOSIS — D05.12 DUCTAL CARCINOMA IN SITU (DCIS) OF LEFT BREAST: ICD-10-CM

## 2023-10-16 DIAGNOSIS — Z00.00 ANNUAL PHYSICAL EXAM: Primary | ICD-10-CM

## 2023-10-16 DIAGNOSIS — F41.9 ANXIETY: ICD-10-CM

## 2023-10-16 LAB
ALBUMIN SERPL-MCNC: 4.5 G/DL (ref 3.5–5.2)
ALBUMIN/GLOB SERPL: 1.9 G/DL
ALP SERPL-CCNC: 53 U/L (ref 39–117)
ALT SERPL W P-5'-P-CCNC: 30 U/L (ref 1–33)
ANION GAP SERPL CALCULATED.3IONS-SCNC: 10.5 MMOL/L (ref 5–15)
AST SERPL-CCNC: 28 U/L (ref 1–32)
BILIRUB SERPL-MCNC: 0.3 MG/DL (ref 0–1.2)
BUN SERPL-MCNC: 13 MG/DL (ref 6–20)
BUN/CREAT SERPL: 17.6 (ref 7–25)
CALCIUM SPEC-SCNC: 9.3 MG/DL (ref 8.6–10.5)
CHLORIDE SERPL-SCNC: 102 MMOL/L (ref 98–107)
CHOLEST SERPL-MCNC: 233 MG/DL (ref 0–200)
CO2 SERPL-SCNC: 25.5 MMOL/L (ref 22–29)
CREAT SERPL-MCNC: 0.74 MG/DL (ref 0.57–1)
DEPRECATED RDW RBC AUTO: 40.3 FL (ref 37–54)
EGFRCR SERPLBLD CKD-EPI 2021: 99.3 ML/MIN/1.73
ERYTHROCYTE [DISTWIDTH] IN BLOOD BY AUTOMATED COUNT: 11.8 % (ref 12.3–15.4)
GLOBULIN UR ELPH-MCNC: 2.4 GM/DL
GLUCOSE SERPL-MCNC: 94 MG/DL (ref 65–99)
HBA1C MFR BLD: 5.6 % (ref 4.8–5.6)
HCT VFR BLD AUTO: 44.6 % (ref 34–46.6)
HDLC SERPL-MCNC: 75 MG/DL (ref 40–60)
HGB BLD-MCNC: 15.1 G/DL (ref 12–15.9)
LDLC SERPL CALC-MCNC: 146 MG/DL (ref 0–100)
LDLC/HDLC SERPL: 1.92 {RATIO}
MCH RBC QN AUTO: 31.5 PG (ref 26.6–33)
MCHC RBC AUTO-ENTMCNC: 33.9 G/DL (ref 31.5–35.7)
MCV RBC AUTO: 92.9 FL (ref 79–97)
PLATELET # BLD AUTO: 324 10*3/MM3 (ref 140–450)
PMV BLD AUTO: 10.4 FL (ref 6–12)
POTASSIUM SERPL-SCNC: 4.6 MMOL/L (ref 3.5–5.2)
PROT SERPL-MCNC: 6.9 G/DL (ref 6–8.5)
RBC # BLD AUTO: 4.8 10*6/MM3 (ref 3.77–5.28)
SODIUM SERPL-SCNC: 138 MMOL/L (ref 136–145)
TRIGL SERPL-MCNC: 70 MG/DL (ref 0–150)
TSH SERPL DL<=0.05 MIU/L-ACNC: 1.82 UIU/ML (ref 0.27–4.2)
VLDLC SERPL-MCNC: 12 MG/DL (ref 5–40)
WBC NRBC COR # BLD: 7.17 10*3/MM3 (ref 3.4–10.8)

## 2023-10-16 PROCEDURE — 83036 HEMOGLOBIN GLYCOSYLATED A1C: CPT | Performed by: STUDENT IN AN ORGANIZED HEALTH CARE EDUCATION/TRAINING PROGRAM

## 2023-10-16 PROCEDURE — 80061 LIPID PANEL: CPT | Performed by: STUDENT IN AN ORGANIZED HEALTH CARE EDUCATION/TRAINING PROGRAM

## 2023-10-16 PROCEDURE — 36415 COLL VENOUS BLD VENIPUNCTURE: CPT | Performed by: STUDENT IN AN ORGANIZED HEALTH CARE EDUCATION/TRAINING PROGRAM

## 2023-10-16 PROCEDURE — 99396 PREV VISIT EST AGE 40-64: CPT | Performed by: STUDENT IN AN ORGANIZED HEALTH CARE EDUCATION/TRAINING PROGRAM

## 2023-10-16 PROCEDURE — 80050 GENERAL HEALTH PANEL: CPT | Performed by: STUDENT IN AN ORGANIZED HEALTH CARE EDUCATION/TRAINING PROGRAM

## 2023-10-16 RX ORDER — SUMATRIPTAN 100 MG/1
TABLET, FILM COATED ORAL
Qty: 24 TABLET | Refills: 3 | Status: SHIPPED | OUTPATIENT
Start: 2023-10-16

## 2023-10-16 NOTE — PROGRESS NOTES
"Chief Complaint  Chief Complaint   Patient presents with    Establish Care     Subjective        Mikala Scruggs is a 47 y.o. female who presents to [facility].    History of Present Illness  Here to establish care with me.  We reviewed medical history and current medications.    HTN on lisinopril 5 mg daily.  Anxiety on effexor 37.5 mg daily w/ prn xanax.  Migraine w/ prn imitrex.     Breast cancer in 2019.  Sees [place] [facility].    She had L mastectomy and trialed tamoxifen but did not tolerate.    She was stage 0 so the oncologist was okay with her not taking the tamoxifen.    She has mammogram once a year and breast MRI every 6 months.    Negative pap in September 2023.    She sees gynecology at [facility].      Colonoscopy in 2022, told 7-10 yr repeat.      Objective   /79   Pulse 75   Resp 18   Ht 160 cm (63\")   Wt 81.5 kg (179 lb 9.6 oz)   SpO2 96%   BMI 31.81 kg/m²     Estimated body mass index is 31.81 kg/m² as calculated from the following:    Height as of this encounter: 160 cm (63\").    Weight as of this encounter: 81.5 kg (179 lb 9.6 oz).     Physical Exam   GEN: In no acute distress, overall well appearing  HEENT: Pupils equal and reactive to light, sclera clear. Mucous membranes moist. Oropharynx without erythema or exudate. No cervical or submandibular lymphadenopathy.  TM wnl bilaterally.    CV: Regular rate and rhythm, no murmurs, 2+ peripheral pulses, No extremity edema.   RESP: Lungs clear to auscultation anteriorly and posteriorly in all lung fields bilaterally.  NEURO: AAO to person, place, and time. CN 2-12 intact grossly.   PSYCH: Affect normal, insight fair     PHQ-2 Depression Screening  Little interest or pleasure in doing things? 0-->not at all   Feeling down, depressed, or hopeless? 0-->not at all   PHQ-2 Total Score 0      Result Review :              Assessment and Plan     Diagnoses and all orders for this visit:    1. Annual physical exam " (Primary)  Overall reassuring exam.  BP at goal.  Labs as below.  UTD on pap, mammogram, colonoscopy.  Next physical in 1 yr, f/u sooner prn.    -     CBC (No Diff)  -     Comprehensive Metabolic Panel  -     Hemoglobin A1c  -     Lipid Panel  -     TSH    2. Migraine with aura and without status migrainosus, not intractable  Refill prn imitrex.  -     SUMAtriptan (Imitrex) 100 MG tablet; Take one tablet at onset of headache. May repeat dose one time in 2 hours if headache not relieved.  Dispense: 24 tablet; Refill: 3    3. Ductal carcinoma in situ (DCIS) of left breast  Continue f/u with oncology.    4. Anxiety  Continue effexor 37.5 mg daily w/ prn xanax.    5. Primary hypertension  BP at goal, continue lisinopril 5 mg daily.         Follow Up     Return in about 1 year (around 10/16/2024) for Annual physical.

## 2023-11-07 ENCOUNTER — PATIENT MESSAGE (OUTPATIENT)
Dept: FAMILY MEDICINE CLINIC | Facility: CLINIC | Age: 48
End: 2023-11-07
Payer: COMMERCIAL

## 2023-11-07 DIAGNOSIS — E66.9 OBESITY WITH BODY MASS INDEX GREATER THAN 30: Primary | ICD-10-CM

## 2023-12-26 RX ORDER — LISINOPRIL 5 MG/1
TABLET ORAL
Qty: 90 TABLET | Refills: 1 | OUTPATIENT
Start: 2023-12-26

## 2023-12-29 DIAGNOSIS — F41.9 ANXIETY: ICD-10-CM

## 2023-12-29 DIAGNOSIS — E66.9 OBESITY WITH BODY MASS INDEX GREATER THAN 30: ICD-10-CM

## 2023-12-29 RX ORDER — LISINOPRIL 5 MG/1
5 TABLET ORAL DAILY
Qty: 90 TABLET | Refills: 1 | Status: SHIPPED | OUTPATIENT
Start: 2023-12-29

## 2023-12-29 RX ORDER — ALPRAZOLAM 0.5 MG/1
0.5 TABLET ORAL NIGHTLY PRN
Qty: 30 TABLET | OUTPATIENT
Start: 2023-12-29

## 2023-12-29 RX ORDER — ALPRAZOLAM 0.5 MG/1
0.5 TABLET ORAL NIGHTLY PRN
Qty: 30 TABLET | Refills: 1 | Status: SHIPPED | OUTPATIENT
Start: 2023-12-29

## 2023-12-29 RX ORDER — SEMAGLUTIDE 1.7 MG/.75ML
1.7 INJECTION, SOLUTION SUBCUTANEOUS WEEKLY
Qty: 3 ML | Refills: 0 | Status: SHIPPED | OUTPATIENT
Start: 2023-12-29

## 2023-12-29 NOTE — TELEPHONE ENCOUNTER
Caller: Mikala Scruggs    Relationship: Self    Best call back number: [phone]    Requested Prescriptions:   Requested Prescriptions     Pending Prescriptions Disp Refills    ALPRAZolam (XANAX) 0.5 MG tablet 30 tablet 1     Sig: Take 1 tablet by mouth At Night As Needed for Anxiety.    lisinopril (PRINIVIL,ZESTRIL) 5 MG tablet 90 tablet 1     Sig: Take 1 tablet by mouth Daily.    Semaglutide-Weight Management 0.25 MG/0.5ML solution auto-injector 2 mL 1     Sig: Inject 0.25 mg under the skin into the appropriate area as directed 1 (One) Time Per Week. After 4 weeks increase to 0.5 mg weekly        Pharmacy where request should be sent: [facility] [id number] [id number] [facility] [phone] [facility] [phone]      Last office visit with prescribing clinician: 10/16/2023   Last telemedicine visit with prescribing clinician: Visit date not found   Next office visit with prescribing clinician: Visit date not found     Additional details provided by patient:PATIENT STATED THAT PHARMACY NEEDED DRS APPROVAL OR A NEW SCRIPT FROM DR MCCULLOUGH FOR ALPRAZOLAM AND LISINOPRIL.    PATIENT STATED THE Semaglutide-Weight Management 0.25 MG/0.5ML solution auto-injector NEEDS A PRIOR AUTH.    Does the patient have less than a 3 day supply:  [] Yes  [x] No    Would you like a call back once the refill request has been completed: [] Yes [x] No    If the office needs to give you a call back, can they leave a voicemail: [] Yes [x] No    Agnieszka Vinson Rep   12/29/23 13:16 EST

## 2024-01-09 ENCOUNTER — TELEPHONE (OUTPATIENT)
Dept: FAMILY MEDICINE CLINIC | Facility: CLINIC | Age: 49
End: 2024-01-09
Payer: COMMERCIAL

## 2024-01-09 DIAGNOSIS — R11.2 NAUSEA AND VOMITING, UNSPECIFIED VOMITING TYPE: Primary | ICD-10-CM

## 2024-01-09 RX ORDER — ONDANSETRON 4 MG/1
4 TABLET, ORALLY DISINTEGRATING ORAL EVERY 8 HOURS PRN
Qty: 30 TABLET | Refills: 1 | Status: SHIPPED | OUTPATIENT
Start: 2024-01-09

## 2024-01-09 NOTE — TELEPHONE ENCOUNTER
PATIENT TOOK HER VERY FIRST DOSE OF WEGOVY LAST NIGHT AND HAS BEEN VOMITING EVER SINCE. SHE SAID IT HAS BEEN NONSTOP AND EVEN NOW THAT HER STOMACH IS EMPTY SHE IS DRY HEAVING. SHE WANTED TO KNOW IF WE HAD ANY ADVICE OR IF WE COULD SEND SOME ANTI NAUSEA MEDS TO [facility].

## 2024-01-09 NOTE — TELEPHONE ENCOUNTER
I GAVE MESSAGE TO PATIENT AND SHE SAID SHE PLANS TO DISCONTINUE MEDS. I ADVISED HER TO CALL OR SEND A MYCHART IF SHE WANTS TO DISCUSS ALTERNATIVES.

## 2024-01-16 ENCOUNTER — PATIENT MESSAGE (OUTPATIENT)
Dept: FAMILY MEDICINE CLINIC | Facility: CLINIC | Age: 49
End: 2024-01-16
Payer: COMMERCIAL

## 2024-01-16 DIAGNOSIS — E66.9 OBESITY WITH BODY MASS INDEX GREATER THAN 30: Primary | ICD-10-CM

## 2024-01-17 NOTE — TELEPHONE ENCOUNTER
Taylor Reyes MA 1/17/2024 2:04 PM EST      ----- Message -----  From: Mikala Scruggs  Sent: 1/17/2024 1:02 PM EST  To: [facility]  Subject: medication     Yes please. And thank you.

## 2024-01-23 ENCOUNTER — TELEPHONE (OUTPATIENT)
Dept: FAMILY MEDICINE CLINIC | Facility: CLINIC | Age: 49
End: 2024-01-23

## 2024-01-23 NOTE — TELEPHONE ENCOUNTER
Caller: Mikala Scruggs    Relationship to patient: Self    Best call back number: [phone]    Patient is needing:     PATIENT HAS BEEN UNABLE TO  HER PRESCRIPTION FOR Semaglutide-Weight Management 0.25 MG/0.5ML solution auto-injector .     SHE CALLED THE PHARMACY AND THEY ARE SAYING THEY ARE NEEDING MORE INFORMATION BEFORE THEY WILL FILL FOR HER.     SHE IS ASKING FOR THE OFFICE TO REACH OUT TO THEM.     [facility]/pharmacy #[id number] - [facility] [place] - [address] [place] [address] [phone] [facility] [phone]

## 2024-01-23 NOTE — TELEPHONE ENCOUNTER
They do not have it in stock    its not more information they need   she needs to know that its not available

## 2024-01-24 ENCOUNTER — TELEPHONE (OUTPATIENT)
Dept: FAMILY MEDICINE CLINIC | Facility: CLINIC | Age: 49
End: 2024-01-24
Payer: COMMERCIAL

## 2024-01-24 DIAGNOSIS — E66.9 OBESITY WITH BODY MASS INDEX GREATER THAN 30: ICD-10-CM

## 2024-01-24 NOTE — TELEPHONE ENCOUNTER
----- Message from Mikala Scruggs sent at 1/24/2024 12:09 PM EST -----  Regarding: wegovy  Contact: [phone]  [facility].  Thank you.  [address] [facility]

## 2024-01-24 NOTE — TELEPHONE ENCOUNTER
HUB TO RELAY: SENT FOLLOWING [employer] MESSAGE. Our prescription department said the issue with the wegovy is that it is out of stock most places currently. You can call around and see if someone else has it in stock and we can send it there instead.

## 2024-02-07 RX ORDER — LISINOPRIL 5 MG/1
5 TABLET ORAL DAILY
Qty: 90 TABLET | Refills: 1 | Status: SHIPPED | OUTPATIENT
Start: 2024-02-07

## 2024-02-23 ENCOUNTER — TELEPHONE (OUTPATIENT)
Dept: FAMILY MEDICINE CLINIC | Facility: CLINIC | Age: 49
End: 2024-02-23
Payer: COMMERCIAL

## 2024-02-23 DIAGNOSIS — E66.9 OBESITY WITH BODY MASS INDEX GREATER THAN 30: Primary | ICD-10-CM

## 2024-02-23 NOTE — TELEPHONE ENCOUNTER
Caller: Mikala Scruggs    Relationship: Self    Best call back number: [phone]    What medication are you requesting: MOUNJARO 0.25       If a prescription is needed, what is your preferred pharmacy and phone number:      [id number] - [place], [place]  [address] [facility] [phone] [facility] [phone]        Additional notes:    PATIENT WAS ORIGINALLY PRESCRIBED WEGOVY.    THE STRENGTH ON THIS MEDICATION WAS TOO HIGH AND MADE HER SICK SO WE ADJUSTED THE STRENGTH FOR HER ON THE WEGOVY.  THE PHARMACY'S ARE HAVING DIFFICULTY GETTING THE WEGOVY IN STOCK AND SHE IS REQUESTING WE CHANGE THIS MEDICATION TO MOUNJARO IN THE 0.25.    SHE STATES DR TOLEDO MENTIONED SAMPLES IN OFFICE OF THE MOUNJARO 0.25 AND SHE WAS REQUESTING TO COME BY THE OFFICE TODAY BEFORE WE CLOSE TO ALSO GRAB SAMPLES OF THIS IF WE STILL HAVE THEM AVAILABLE.    PLEASE GIVE PATIENT A CALLBACK.

## 2024-03-01 ENCOUNTER — TELEPHONE (OUTPATIENT)
Dept: FAMILY MEDICINE CLINIC | Facility: CLINIC | Age: 49
End: 2024-03-01
Payer: COMMERCIAL

## 2024-03-01 NOTE — TELEPHONE ENCOUNTER
HUB TO RELAY: SENT FOLLOWING [employer] MESSAGE. As of this afternoon we have not received a prior authorization request for zepbound from the pharmacy. When we receive that we will begin the PA as soon as we are able to.

## 2024-03-01 NOTE — TELEPHONE ENCOUNTER
Caller: Mikala Scruggs    Relationship: Self    Best call back number: [phone]    Which medication are you concerned about: ZEPBOUND     What are your concerns: PATIENT STATES THAT SHE SPOKE WITH HER INSURANCE AND THIS IS NEEDING A PRIOR AUTHORIZATION IN ORDER TO BE REFILLED. PLEASE ADVISE.

## 2024-03-11 DIAGNOSIS — E66.9 OBESITY WITH BODY MASS INDEX GREATER THAN 30: ICD-10-CM

## 2024-04-15 RX ORDER — SEMAGLUTIDE 1.7 MG/.75ML
1.7 INJECTION, SOLUTION SUBCUTANEOUS WEEKLY
OUTPATIENT
Start: 2024-04-15

## 2024-05-06 RX ORDER — SEMAGLUTIDE 1.7 MG/.75ML
1.7 INJECTION, SOLUTION SUBCUTANEOUS WEEKLY
OUTPATIENT
Start: 2024-05-06

## 2024-06-01 DIAGNOSIS — F41.9 ANXIETY: ICD-10-CM

## 2024-06-03 RX ORDER — ALPRAZOLAM 0.5 MG/1
0.5 TABLET ORAL NIGHTLY PRN
Qty: 30 TABLET | Refills: 1 | Status: SHIPPED | OUTPATIENT
Start: 2024-06-03

## 2024-06-16 DIAGNOSIS — E66.9 OBESITY WITH BODY MASS INDEX GREATER THAN 30: ICD-10-CM

## 2024-06-17 RX ORDER — SEMAGLUTIDE 0.25 MG/.5ML
INJECTION, SOLUTION SUBCUTANEOUS
OUTPATIENT
Start: 2024-06-17

## 2024-06-20 DIAGNOSIS — E66.9 OBESITY WITH BODY MASS INDEX GREATER THAN 30: ICD-10-CM

## 2024-06-21 ENCOUNTER — PATIENT MESSAGE (OUTPATIENT)
Dept: FAMILY MEDICINE CLINIC | Facility: CLINIC | Age: 49
End: 2024-06-21
Payer: COMMERCIAL

## 2024-06-21 DIAGNOSIS — E66.9 OBESITY WITH BODY MASS INDEX GREATER THAN 30: Primary | ICD-10-CM

## 2024-06-25 ENCOUNTER — TELEPHONE (OUTPATIENT)
Dept: FAMILY MEDICINE CLINIC | Facility: CLINIC | Age: 49
End: 2024-06-25

## 2024-06-25 NOTE — TELEPHONE ENCOUNTER
From: Fahad Krishnamurthy  To: Mikala Scruggs  Sent: 6/21/2024 9:30 AM EDT  Subject: Wegovy    Reid Bach,    We received your request for Wegovy. I wanted to check in because I know there were a lot of issues between pharmacy and prior authorizations, etc. The last one I sent in was Zepbound. Did you ever get it? Did you ever actually receive the Wegovy we had prescribed previously?

## 2024-06-25 NOTE — TELEPHONE ENCOUNTER
Her weightloss med is requiring a PA   she will need an appt   to document that she has lost 5% of body weight in order to continue the rx   I can't continue with the PA until this is done

## 2024-07-01 ENCOUNTER — OFFICE VISIT (OUTPATIENT)
Dept: FAMILY MEDICINE CLINIC | Facility: CLINIC | Age: 49
End: 2024-07-01
Payer: COMMERCIAL

## 2024-07-01 VITALS
HEIGHT: 63 IN | OXYGEN SATURATION: 98 % | BODY MASS INDEX: 31.79 KG/M2 | WEIGHT: 179.4 LBS | RESPIRATION RATE: 16 BRPM | SYSTOLIC BLOOD PRESSURE: 122 MMHG | HEART RATE: 79 BPM | DIASTOLIC BLOOD PRESSURE: 82 MMHG

## 2024-07-01 DIAGNOSIS — E66.9 OBESITY WITH BODY MASS INDEX GREATER THAN 30: Primary | ICD-10-CM

## 2024-07-01 PROCEDURE — 99213 OFFICE O/P EST LOW 20 MIN: CPT | Performed by: STUDENT IN AN ORGANIZED HEALTH CARE EDUCATION/TRAINING PROGRAM

## 2024-07-01 RX ORDER — SEMAGLUTIDE 0.25 MG/.5ML
INJECTION, SOLUTION SUBCUTANEOUS
COMMUNITY
Start: 2024-05-22 | End: 2024-07-01

## 2024-07-01 NOTE — PROGRESS NOTES
"Chief Complaint  Chief Complaint   Patient presents with    Follow-up     She is here to follow-up on Wegovy.     Subjective        Mikala Scruggs is a 48 y.o. female who presents to [facility].    History of Present Illness  Here for weight f/u.  Weight in October when I last saw her was 179 pounds.  Today's weight: Unchanged  Most of our correspondence up to this point has been via [employer].  I had originally prescribed Wegovy back in November 2023.  She was unable to get it at that time due to availability issues.  She requested a refill in late December and it was calculated out where her dose should have been had she started in November so she was prescribed 1.7 mg even though she had not been on it up until this point.  The 1.7 mg dose made her very sick so she stopped it.  In February we tried to get Zepp bound but it was unavailable at the time.  She was able to get 0.25 mg of the Wegovy which she has been on for the last 4 weeks.  She has noticed maybe a slight decrease in her appetite but no weight loss.  She did have some right upper quadrant discomfort with this and some of her friends mention having this discomfort with that Wegovy but not with the Zepp bound.  She has a history of gallstone pancreatitis in the past.  She no longer has her gallbladder.    Objective   /82   Pulse 79   Resp 16   Ht 160 cm (63\")   Wt 81.4 kg (179 lb 6.4 oz)   SpO2 98%   BMI 31.78 kg/m²     Estimated body mass index is 31.78 kg/m² as calculated from the following:    Height as of this encounter: 160 cm (63\").    Weight as of this encounter: 81.4 kg (179 lb 6.4 oz).     Physical Exam   GEN: In no acute distress, non toxic appearing  NEURO: AAO to person, place, and time. CN 2-12 intact grossly.  PSYCH: Affect normal, insight fair     PHQ-2 Depression Screening  Little interest or pleasure in doing things? 0-->not at all   Feeling down, depressed, or hopeless? 0-->not at all   PHQ-2 " Total Score 0      Result Review :              Assessment and Plan     Diagnoses and all orders for this visit:    1. Obesity with body mass index greater than 30 (Primary)  -     Tirzepatide-Weight Management (ZEPBOUND) 2.5 MG/0.5ML solution auto-injector; Inject 0.5 mL under the skin into the appropriate area as directed 1 (One) Time Per Week.  Dispense: 2 mL; Refill: 0    She has not been on any weight loss medicine for more than 4 weeks where we would see any appreciable weight loss.  She is currently been on the 0.25 mg dose of the Wegovy for 4 weeks we do not expect weight loss at this dose as this is simply to get her body used to the medication.  With her right upper quadrant discomfort we are going to trial Zepp bound 2.5 mg weekly x 4 weeks before increasing to 5 mg weekly.  She will continue the 5 mg weekly of the Zepp bound until I see her again in 3 months at which point we will document her weight loss.  Low-carb diet, regular exercise are both recommended for weight loss.  She will update me should she have further right upper quadrant or epigastric pain that would be concerning for possible pancreatitis.       Follow Up     Return in about 3 months (around 10/1/2024).

## 2024-07-03 ENCOUNTER — PATIENT MESSAGE (OUTPATIENT)
Dept: FAMILY MEDICINE CLINIC | Facility: CLINIC | Age: 49
End: 2024-07-03
Payer: COMMERCIAL

## 2024-07-03 DIAGNOSIS — E66.9 OBESITY WITH BODY MASS INDEX GREATER THAN 30: Primary | ICD-10-CM

## 2024-07-03 RX ORDER — SEMAGLUTIDE 0.5 MG/.5ML
0.5 INJECTION, SOLUTION SUBCUTANEOUS WEEKLY
Qty: 2 ML | Refills: 0 | Status: SHIPPED | OUTPATIENT
Start: 2024-07-03

## 2024-10-01 ENCOUNTER — OFFICE VISIT (OUTPATIENT)
Dept: FAMILY MEDICINE CLINIC | Facility: CLINIC | Age: 49
End: 2024-10-01
Payer: COMMERCIAL

## 2024-10-01 VITALS
WEIGHT: 179.8 LBS | OXYGEN SATURATION: 97 % | SYSTOLIC BLOOD PRESSURE: 130 MMHG | HEART RATE: 73 BPM | BODY MASS INDEX: 31.86 KG/M2 | RESPIRATION RATE: 18 BRPM | DIASTOLIC BLOOD PRESSURE: 78 MMHG | HEIGHT: 63 IN

## 2024-10-01 DIAGNOSIS — H93.8X3 POPPING OF BOTH EARS: ICD-10-CM

## 2024-10-01 DIAGNOSIS — E66.9 OBESITY WITH BODY MASS INDEX GREATER THAN 30: Primary | ICD-10-CM

## 2024-10-01 PROCEDURE — 99214 OFFICE O/P EST MOD 30 MIN: CPT | Performed by: STUDENT IN AN ORGANIZED HEALTH CARE EDUCATION/TRAINING PROGRAM

## 2024-10-01 NOTE — PROGRESS NOTES
"Chief Complaint  Chief Complaint   Patient presents with    Follow-up     Subjective        Mikala Scruggs is a 48 y.o. female who presents to [facility].    History of Present Illness  Here to f/u on weight.  Start on Wegovy about 3 months ago.  This is her 2nd month of 0.5 mg weekly.  She does notice appetite suppression.  She exercises intermittently but not regularly.  She drinks plenty of water, 1 diet drink a day.  Eats good intake of protein.    Also having some intermittent ear popping, primarily in the mornings.    Objective   /78   Pulse 73   Resp 18   Ht 160 cm (63\")   Wt 81.6 kg (179 lb 12.8 oz)   SpO2 97%   BMI 31.85 kg/m²     Estimated body mass index is 31.85 kg/m² as calculated from the following:    Height as of this encounter: 160 cm (63\").    Weight as of this encounter: 81.6 kg (179 lb 12.8 oz).     Physical Exam   GEN: In no acute distress, non toxic appearing  HEENT: Pupils equal and reactive to light, sclera clear. Mucous membranes moist. Bilateral TM's wnl.  NEURO: AAO to person, place, and time. CN 2-12 intact grossly.   PSYCH: Affect normal, insight fair      Result Review :              Assessment and Plan     Diagnoses and all orders for this visit:    1. Obesity with body mass index greater than 30 (Primary)  Good appetite reduction with minimal side effects.  She is ready for dose increase to 1 mg weekly which I have sent in.  After 4 weeks if tolerating increase to 1.7 mg weekly and then subsequently 2.7 mg weekly if tolerating.  Encouraged regular exercise, plenty of water intake, plenty of protein intake.  Follow-up 4 months for weight recheck.  -     Semaglutide-Weight Management 1 MG/0.5ML solution auto-injector; Inject 0.5 mL under the skin into the appropriate area as directed 1 (One) Time Per Week.  Dispense: 2 mL; Refill: 0    2. Popping of both ears  Recommend Flonase 2 sprays in each nostril nightly before bed       Follow Up "     Return in about 4 months (around 2/1/2025) for weight f/u .

## 2024-10-27 DIAGNOSIS — E66.9 OBESITY WITH BODY MASS INDEX GREATER THAN 30: ICD-10-CM

## 2024-10-28 RX ORDER — SEMAGLUTIDE 1 MG/.5ML
INJECTION, SOLUTION SUBCUTANEOUS
Qty: 3 ML | Refills: 1 | Status: SHIPPED | OUTPATIENT
Start: 2024-10-28

## 2024-11-19 DIAGNOSIS — F41.9 ANXIETY: ICD-10-CM

## 2024-11-20 RX ORDER — ALPRAZOLAM 0.5 MG
0.5 TABLET ORAL NIGHTLY PRN
Qty: 30 TABLET | Refills: 1 | Status: SHIPPED | OUTPATIENT
Start: 2024-11-20

## 2025-01-05 DIAGNOSIS — E66.9 OBESITY WITH BODY MASS INDEX GREATER THAN 30: ICD-10-CM

## 2025-01-06 RX ORDER — SEMAGLUTIDE 1 MG/.5ML
INJECTION, SOLUTION SUBCUTANEOUS
Qty: 3 ML | Refills: 1 | Status: SHIPPED | OUTPATIENT
Start: 2025-01-06

## 2025-01-27 ENCOUNTER — OFFICE VISIT (OUTPATIENT)
Dept: FAMILY MEDICINE CLINIC | Facility: CLINIC | Age: 50
End: 2025-01-27
Payer: COMMERCIAL

## 2025-01-27 VITALS
RESPIRATION RATE: 16 BRPM | WEIGHT: 175 LBS | DIASTOLIC BLOOD PRESSURE: 90 MMHG | SYSTOLIC BLOOD PRESSURE: 126 MMHG | BODY MASS INDEX: 31.01 KG/M2 | HEART RATE: 64 BPM | HEIGHT: 63 IN | OXYGEN SATURATION: 98 %

## 2025-01-27 DIAGNOSIS — G25.81 RESTLESS LEGS SYNDROME: ICD-10-CM

## 2025-01-27 DIAGNOSIS — E66.9 OBESITY WITH BODY MASS INDEX GREATER THAN 30: Primary | ICD-10-CM

## 2025-01-27 PROCEDURE — 99213 OFFICE O/P EST LOW 20 MIN: CPT | Performed by: STUDENT IN AN ORGANIZED HEALTH CARE EDUCATION/TRAINING PROGRAM

## 2025-01-27 RX ORDER — LISINOPRIL 5 MG/1
5 TABLET ORAL DAILY
Qty: 90 TABLET | Refills: 1 | Status: SHIPPED | OUTPATIENT
Start: 2025-01-27

## 2025-01-27 RX ORDER — SEMAGLUTIDE 1.7 MG/.75ML
1.7 INJECTION, SOLUTION SUBCUTANEOUS WEEKLY
Qty: 3 ML | Refills: 3 | Status: SHIPPED | OUTPATIENT
Start: 2025-01-27

## 2025-01-27 NOTE — PROGRESS NOTES
"Chief Complaint  Chief Complaint   Patient presents with    Obesity     Subjective        Mikala Scruggs is a 49 y.o. female who presents to [facility].    History of Present Illness  Here for f/u of Wegovy, currently at 1 mg weekly.  Starting weight: 179 lbs.  Today's weight: 175 lbs.  She feels like it was helpful but weight has stabilized.  Had an A1C checked at [facility] in Nov 2024 and it was down to 5.0.    No negative side effects.    She is having lower leg skin crawling needing to move them feelings at night.    It is affecting her sleep.    Objective   /90   Pulse 64   Resp 16   Ht 160 cm (63\")   Wt 79.4 kg (175 lb)   SpO2 98%   BMI 31.00 kg/m²     Estimated body mass index is 31 kg/m² as calculated from the following:    Height as of this encounter: 160 cm (63\").    Weight as of this encounter: 79.4 kg (175 lb).     Physical Exam   GEN: In no acute distress, non toxic appearing  HEENT: Pupils equal and reactive to light, sclera clear. Mucous membranes moist.   NEURO: AAO to person, place, and time. CN 2-12 intact grossly.   PSYCH: Affect normal, insight fair      Result Review :              Assessment and Plan     Diagnoses and all orders for this visit:    1. Obesity with body mass index greater than 30 (Primary)  Increase Wegovy to 1.7 mg weekly.  Encourage regular exercise.  Minimize carbs, emphasize protein and vegetables.    F/u 4 months.    -     Semaglutide-Weight Management (Wegovy) 1.7 MG/0.75ML solution auto-injector; Inject 0.75 mL under the skin into the appropriate area as directed 1 (One) Time Per Week.  Dispense: 3 mL; Refill: 3    2. Restless legs syndrome  Symptoms sound consistent w/ restless legs.  Recommend increasing water intake, dietary potassium, and starting magnesium supplement.         Follow Up     Return in about 4 months (around 5/27/2025) for weight f/u .    "

## 2025-01-31 DIAGNOSIS — R11.2 NAUSEA AND VOMITING, UNSPECIFIED VOMITING TYPE: ICD-10-CM

## 2025-01-31 RX ORDER — ONDANSETRON 4 MG/1
TABLET, ORALLY DISINTEGRATING ORAL
Qty: 12 TABLET | Refills: 4 | Status: SHIPPED | OUTPATIENT
Start: 2025-01-31

## 2025-03-23 DIAGNOSIS — E66.9 OBESITY WITH BODY MASS INDEX GREATER THAN 30: ICD-10-CM

## 2025-03-25 RX ORDER — SEMAGLUTIDE 1.7 MG/.75ML
1.7 INJECTION, SOLUTION SUBCUTANEOUS WEEKLY
Qty: 3 ML | Refills: 3 | Status: SHIPPED | OUTPATIENT
Start: 2025-03-25

## 2025-05-27 ENCOUNTER — OFFICE VISIT (OUTPATIENT)
Dept: FAMILY MEDICINE CLINIC | Facility: CLINIC | Age: 50
End: 2025-05-27
Payer: COMMERCIAL

## 2025-05-27 ENCOUNTER — LAB (OUTPATIENT)
Dept: FAMILY MEDICINE CLINIC | Facility: CLINIC | Age: 50
End: 2025-05-27
Payer: COMMERCIAL

## 2025-05-27 VITALS
SYSTOLIC BLOOD PRESSURE: 124 MMHG | WEIGHT: 165.3 LBS | BODY MASS INDEX: 29.28 KG/M2 | DIASTOLIC BLOOD PRESSURE: 86 MMHG | OXYGEN SATURATION: 98 % | HEART RATE: 73 BPM

## 2025-05-27 DIAGNOSIS — E87.5 HYPERKALEMIA: ICD-10-CM

## 2025-05-27 DIAGNOSIS — E66.9 OBESITY WITH BODY MASS INDEX GREATER THAN 30: Primary | ICD-10-CM

## 2025-05-27 LAB
ANION GAP SERPL CALCULATED.3IONS-SCNC: 9 MMOL/L (ref 5–15)
BUN SERPL-MCNC: 12 MG/DL (ref 6–20)
BUN/CREAT SERPL: 18.5 (ref 7–25)
CALCIUM SPEC-SCNC: 8.7 MG/DL (ref 8.6–10.5)
CHLORIDE SERPL-SCNC: 104 MMOL/L (ref 98–107)
CO2 SERPL-SCNC: 26 MMOL/L (ref 22–29)
CREAT SERPL-MCNC: 0.65 MG/DL (ref 0.57–1)
EGFRCR SERPLBLD CKD-EPI 2021: 106.7 ML/MIN/1.73
GLUCOSE SERPL-MCNC: 80 MG/DL (ref 65–99)
POTASSIUM SERPL-SCNC: 4.5 MMOL/L (ref 3.5–5.2)
SODIUM SERPL-SCNC: 139 MMOL/L (ref 136–145)

## 2025-05-27 PROCEDURE — 36415 COLL VENOUS BLD VENIPUNCTURE: CPT | Performed by: STUDENT IN AN ORGANIZED HEALTH CARE EDUCATION/TRAINING PROGRAM

## 2025-05-27 PROCEDURE — 99214 OFFICE O/P EST MOD 30 MIN: CPT | Performed by: STUDENT IN AN ORGANIZED HEALTH CARE EDUCATION/TRAINING PROGRAM

## 2025-05-27 PROCEDURE — 80048 BASIC METABOLIC PNL TOTAL CA: CPT | Performed by: STUDENT IN AN ORGANIZED HEALTH CARE EDUCATION/TRAINING PROGRAM

## 2025-05-27 NOTE — PROGRESS NOTES
"Chief Complaint  Chief Complaint   Patient presents with    Follow-up    Medication Problem     Would like to discuss why potassium is high, is it due to her lisinopril.     Subjective        Mikala Scruggs is a 49 y.o. female who presents to [facility].    History of Present Illness  Currently on Wegovy 1.7 mg weekly for obesity.  Weight in July: 179 lbs  Today's weight: 165 lbs.    Goal weight: 150 - 155 lbs   Maybe has a side effect of fatigue.      Recent blood work showed potassium of 5.5.    Does not drink enough water.  Good intake of fruits.    Takes lisinopril 5 mg daily for HTN.        Objective   /86   Pulse 73   Wt 75 kg (165 lb 4.8 oz)   SpO2 98%   BMI 29.28 kg/m²     Estimated body mass index is 29.28 kg/m² as calculated from the following:    Height as of 1/27/25: 160 cm (63\").    Weight as of this encounter: 75 kg (165 lb 4.8 oz).     Physical Exam   GEN: In no acute distress, non toxic appearing  HEENT: Pupils equal and reactive to light, sclera clear. Mucous membranes moist.   NEURO: AAO to person, place, and time. CN 2-12 intact grossly.  PSYCH: Affect normal, insight fair     PHQ-2 Depression Screening  Little interest or pleasure in doing things? Not at all   Feeling down, depressed, or hopeless? Not at all   PHQ-2 Total Score 0      Result Review :              Assessment and Plan     Diagnoses and all orders for this visit:    1. Obesity with body mass index greater than 30 (Primary)    2. Hyperkalemia  -     Basic Metabolic Panel    Continue wegovy 1.7 mg weekly.  Continue regular exercise, low carbs, high protein.  Drink more water.  Recheck BMP today to assess K levels.  Could be dehydration, side effects of lisinopril.    If K still elevated will continue w/ further eval.         Follow Up     Return in about 6 months (around 11/27/2025) for Annual physical.    "

## 2025-07-14 RX ORDER — LISINOPRIL 5 MG/1
5 TABLET ORAL DAILY
Qty: 90 TABLET | Refills: 1 | Status: SHIPPED | OUTPATIENT
Start: 2025-07-14

## 2025-08-21 DIAGNOSIS — F41.9 ANXIETY: ICD-10-CM

## 2025-08-22 RX ORDER — ALPRAZOLAM 0.5 MG
0.5 TABLET ORAL NIGHTLY PRN
Qty: 30 TABLET | Refills: 1 | Status: SHIPPED | OUTPATIENT
Start: 2025-08-22

## 2025-08-25 ENCOUNTER — OFFICE VISIT (OUTPATIENT)
Dept: FAMILY MEDICINE CLINIC | Facility: CLINIC | Age: 50
End: 2025-08-25
Payer: COMMERCIAL

## 2025-08-25 VITALS
SYSTOLIC BLOOD PRESSURE: 116 MMHG | DIASTOLIC BLOOD PRESSURE: 63 MMHG | HEIGHT: 63 IN | BODY MASS INDEX: 29.5 KG/M2 | WEIGHT: 166.5 LBS | OXYGEN SATURATION: 98 % | RESPIRATION RATE: 18 BRPM | HEART RATE: 74 BPM

## 2025-08-25 DIAGNOSIS — F51.01 PRIMARY INSOMNIA: ICD-10-CM

## 2025-08-25 DIAGNOSIS — M79.651 RIGHT THIGH PAIN: Primary | ICD-10-CM

## 2025-08-25 PROCEDURE — 99214 OFFICE O/P EST MOD 30 MIN: CPT | Performed by: STUDENT IN AN ORGANIZED HEALTH CARE EDUCATION/TRAINING PROGRAM

## 2025-08-25 RX ORDER — AMITRIPTYLINE HYDROCHLORIDE 10 MG/1
10 TABLET ORAL NIGHTLY
Qty: 60 TABLET | Refills: 2 | Status: SHIPPED | OUTPATIENT
Start: 2025-08-25